# Patient Record
Sex: FEMALE | Race: WHITE
[De-identification: names, ages, dates, MRNs, and addresses within clinical notes are randomized per-mention and may not be internally consistent; named-entity substitution may affect disease eponyms.]

---

## 2017-10-28 ENCOUNTER — HOSPITAL ENCOUNTER (EMERGENCY)
Dept: HOSPITAL 59 - ER | Age: 57
Discharge: HOME | End: 2017-10-28
Payer: COMMERCIAL

## 2017-10-28 DIAGNOSIS — H53.8: Primary | ICD-10-CM

## 2017-10-28 PROCEDURE — 99282 EMERGENCY DEPT VISIT SF MDM: CPT

## 2017-10-28 NOTE — EMERGENCY DEPARTMENT RECORD
History of Present Illness





- General


Chief complaint: Eye Problem


Stated complaint: SOMETHING IN RT EYE


Time Seen by Provider: 10/28/17 20:35


Source: Patient


Mode of Arrival: Ambulatory


Limitations: No limitations





- History of Present Illness


Initial comments: 





55 yo female presents to ED for evaluation of blurry vision and FB sensation to 

the right eye that began after getting hair dye in her right eye 7.5 hours ago. 

Patient reports that she flushed her eye very well, but her symptoms became 

more noticeable this evening.  Patient denies injury to the eye, denies drainage

, fevers, chills, or recent illness.  Patient denies health problems at her 

baseline.


MD chief complaint: Eye redness, Foreign body, Vision change


Onset/Timin


-: Hour(s)


Onset Description: Sudden


Location: Right eye


Place: Other


If Injury: Chemical exposure


Eye Symptoms: Burning, Blurry vision


Severity: Mild


Severity scale (1-10): 2


If Pain, Quality: Burning


Consistency: Constant


Associated Symptoms: None


Treatments Prior to Arrival: Irrigated eye





- Related Data


Visual acuity (L) = 20/: 70


Visual acuity (R) = 20/: 70


With correction: Yes (20/50 ou)


 Home Medications











 Medication  Instructions  Recorded  Confirmed  Last Taken


 


Ibuprofen [Ibuprofen] 800 mg PO ASDIR 10/28/17 10/28/17 Unknown


 


Meloxicam [Meloxicam] 15 mg PO QHS 10/28/17 10/28/17 10/27/17











 Allergies











Allergy/AdvReac Type Severity Reaction Status Date / Time


 


No Known Drug Allergies Allergy   Verified 10/28/17 20:19














Travel Screening





- Travel/Exposure Within Last 30 Days


Have you traveled within the last 30 days?: No





- Travel/Exposure Within Last Year


Have you traveled outside the [place] in the last year?: No





- Additonal Travel Details


Have you been exposed to anyone with a communicable illness?: No





- Travel Symptoms


Symptom Screening: None





Review of Systems


Constitutional: Denies: Chills, Fever, Malaise, Night sweats


Eyes: Reports: Eye pain, Photophobia, Vision change.  Denies: Eye discharge


ENT: Denies: Congestion, Ear pain, Epistaxis


Respiratory: Denies: Cough, Dyspnea


Cardiovascular: Denies: Chest pain, Dyspnea on exertion


Endocrine: Denies: Fatigue, Heat or cold intolerance


Gastrointestinal: Denies: Abdominal pain, Nausea, Vomiting


Genitourinary: Denies: Incontinence, Retention


Musculoskeletal: Denies: Arthralgia, Back pain, Gout, Joint swelling


Skin: Denies: Bruising, Change in color


Neurological: Denies: Abnormal gait, Confusion, Headache, Seizure


Psychiatric: Denies: Anxiety


Hematological/Lymphatic: Denies: Anemia, Blood Clots





Past Medical History





- SOCIAL HISTORY


Smoking Status: Never smoker


Alcohol Use: None, Rare


Drug Use: None





- RESPIRATORY


Hx Respiratory Disorders: No





- CARDIOVASCULAR


Hx Cardio Disorders: No





- NEURO


Hx Neuro Disorders: No





- GI


Hx GI Disorders: No





- 


Hx Genitourinary Disorders: No





- ENDOCRINE


Hx Endocrine Disorders: No





- MUSCULOSKELETAL


Hx Musculoskeletal Disorders: Yes


Comment:: siatica





- PSYCH


Hx Psych Problems: No





- HEMATOLOGY/ONCOLOGY


Hx Hematology/Oncology Disorders: No





Family Medical History


Any Significant Family History?: No





Physical Exam





- General


General Appearance: Alert, Oriented x3, Cooperative, Mild distress


Limitations: No limitations





- Head


Head exam: Atraumatic, Normocephalic, Normal inspection


Head exam detail: negative: Abrasion, Contusion, Villanueva's sign, General 

tenderness, Hematoma, Laceration





- Eye


Eye exam: Normal appearance.  negative: Conjunctival injection, Periorbital 

swelling, Periorbital tenderness, Scleral icterus


With correction: Yes (20/50 ou)





- ENT


Ear exam: negative: Auricular hematoma, Auricular trauma


Nasal Exam: negative: Active bleeding, Discharge, Dried blood, Foreign body


Mouth exam: negative: Drooling, Laceration, Muffled voice, Tongue elevation





- Neck


Neck exam: Normal inspection.  negative: Meningismus, Tenderness





- Respiratory


Respiratory exam: Normal lung sounds bilaterally.  negative: Rales, Respiratory 

distress, Rhonchi, Stridor





- Cardiovascular


Cardiovascular Exam: Regular rate, Normal rhythm, Normal heart sounds





- GI/Abdominal


GI/Abdominal exam: Soft.  negative: Rebound, Rigid, Tenderness





- Rectal


Rectal exam: Deferred





- 


 exam: Deferred





- Extremities


Extremities exam: Normal inspection.  negative: Calf tenderness, Pedal edema, 

Tenderness





- Back


Back exam: Denies: CVA tenderness (R), CVA tenderness (L)





- Neurological


Neurological exam: Alert, Normal gait, Oriented X3





- Psychiatric


Psychiatric exam: Normal affect, Normal mood





- Skin


Skin exam: Normal color.  negative: Abrasion


Type of lesion: negative: abrasion





Course





 Vital Signs











  10/28/17





  20:22


 


Temperature 97.5 F L


 


Pulse Rate [ 83





Pulse Ox Probe] 


 


Respiratory 16





Rate 


 


Blood Pressure 141/83





[Left Arm] 


 


Pulse Ox 97














- Reevaluation(s)


Reevaluation #1: 





10/28/17 20:40


Eye examination: VA reviewed, 20/70 with each eye individually.


pH 7 on examination, florescein staining and Wood's lamp examination does not 

demonstrate and corneal abrasion on examination.  No FB identified on upper/

lower lid eversion as well. Patient was updated on all findings, will initiate 

treatment with Gentamicin ophthalmic drops and instructions to follow-up with 

Dr. Machuca on Monday.  Patient was instructed to return for any worsening of 

her symptoms as well.





Disposition


Disposition: Discharge


Clinical Impression: 


 Sensation of foreign body in eye





Disposition: Home, Self-Care


Condition: (2) Stable


Instructions:  Eye Foreign Body (ED)


Additional Instructions: 


Return to ED if your symptoms worsen or if you have any concerns.


Gentamycin as directed.


Follow-up with Dr. Machuca Monday as directed, call for appointment.


Referrals: 


HOLLIS MACHUCA [MEDICAL DOCTOR] - 


Forms:  Patient Portal Access


Time of Disposition: 20:36





Quality





- Quality Measures


Quality Measures: N/A





- Blood Pressure Screening


Does Patient Have Any of the Following: No


Blood Pressure Classification: Pre-Hypertensive BP Reading


Systolic Measurement: 141


Diastolic Measurement: 83


Screening for High Blood Pressure: < Pre-Hypertensive BP, F/U Documented > [

]


Pre-Hypertensive Follow-up Interventions: Referral to alternative/primary care 

provider.

## 2018-06-06 ENCOUNTER — HOSPITAL ENCOUNTER (OUTPATIENT)
Dept: HOSPITAL 59 - SUR | Age: 58
Discharge: HOME | End: 2018-06-06
Attending: ORTHOPAEDIC SURGERY
Payer: COMMERCIAL

## 2018-06-06 DIAGNOSIS — M19.011: ICD-10-CM

## 2018-06-06 DIAGNOSIS — S43.431A: ICD-10-CM

## 2018-06-06 DIAGNOSIS — M75.41: Primary | ICD-10-CM

## 2018-06-06 DIAGNOSIS — M75.01: ICD-10-CM

## 2018-06-06 PROCEDURE — 29822 SHO ARTHRS SRG LMTD DBRDMT: CPT

## 2018-06-06 PROCEDURE — 23700 MNPJ ANES SHO JT FIXJ APRATS: CPT

## 2018-06-06 PROCEDURE — 01630 ANES OPN/ARTHR PX SHO JT NOS: CPT

## 2018-06-06 PROCEDURE — 23415 RELEASE OF SHOULDER LIGAMENT: CPT

## 2018-06-07 NOTE — OPERATIVE NOTE
DATE:  06/06/2018.



PREOPERATIVE DIAGNOSIS:  SEVERE IMPINGEMENT OF THE RIGHT SHOULDER.  QUESTION 
TEAR OF THE ROTATOR CUFF.



POSTOPERATIVE DIAGNOSES:

1.  SEVERE EXTERNAL IMPINGEMENT, RIGHT SHOULDER.

2.  COMPLEX GLENOHUMERAL LABRAL TEAR FROM APPROXIMATELY THE 12 O'CLOCK TO 4 O'
CLOCK POSITION.

3.  ADVANCED ARTHROSIS, RIGHT DISTAL CLAVICLE.

4.  ADHESIVE CAPSULITIS, RIGHT SHOULDER.



PROCEDURES:

1.  SHOULDER ARTHROSCOPY WITH INTRA-ARTICULAR DEBRIDEMENT.

2.  RIGHT SHOULDER OPEN ACROMIOPLASTY, CORACOACROMIAL LIGAMENT RESECTION, AND 
SUBACROMIAL BURSECTOMY.

3.  RIGHT SHOULDER DISTAL CLAVICLE RESECTION.

4.  RIGHT SHOULDER MANIPULATION UNDER ANESTHESIA.



STAFF SURGEON:  Isrrael Hansno M.D.



ANESTHESIA:  General.



PREPARATION:  ChloraPrep.



INDIVIDUAL CONSIDERATIONS:  None.



DESCRIPTION OF PROCEDURE:  The patient was taken to the operating and placed 
supine on the operating table.  She had a successful induction of a general 
anesthetic.  She was then placed in a semi-seated beach chair position.



Examination under anesthesia showed that she had adhesive capsulitis.  I felt 
resistance to motion at about 110 degrees of forward flexion and abduction.  I 
manipulated her to full motion, and there was no instability.  



The patient had posterior portal identified for arthroscopy.  The skin was 
infiltrated with 0.5% Marcaine with epinephrine prior.  An 18-gauge spinal 
needle was easily placed in the joint, and the joint was inflated with normal 
saline.  A stab wound was made, a blunt-tipped trocar for the scope was placed 
in the joint, and the joint was inflated with normal saline.  An anterior 
accessory portal was then made just inferior to the intact long head of the 
biceps tendon in a retrograde fashion with a Wissinger naa, and the joint was 
irrigated out.  The patient had fraying of the labrum from 12 o'clock to 4 o'
clock.  This was debrided.  The long head was good.  The glenohumeral joint was 
good.  The subscapularis tendon was normal.  The supraspinatus, infraspinatus, 
and teres minor looked normal.  However the supraspinatus had areas of 
injection consistent with inflammation.  No loose bodies were seen in the 
inferior gutter.  After irrigation, the portals were closed with staples.  



The patient had an anterior approach to the subacromial space and distal 
clavicle.  The skin was again infiltrated with 0.5% Marcaine with epinephrine 
prior.  Sharp dissection was carried down through the skin and subcutaneous 
tissue.  Small veins were coagulated with a Bovie.  An anterior deltoid 
interval was developed.  Care was taken not to split the deltoid more than 
about 4.0 cm distal to the anterior tip of the acromion to prevent injury to 
the axillary nerve.  Once in the subacromial space, there was a very thick 
bursa and large spurs.  The deltoid was then taken subperiosteally off the 
anterior aspect of the acromion, over the top of the intact coracoacromial 
ligament, and off the anterior aspect of the degenerated distal clavicle.  The 
coracoacromial ligament was resected with a Bovie.  The distal clavicle was 
resected with an oscillating saw, taking a little under 1.0 cm.  The patient 
had a downsloping acromion with spurs, especially extending from the 
acromioclavicular joint.  An anterior acromioplasty was performed, taking 6.0 
to 7.0 mm anteriorly and then tapering to a wedge posteromedially to include 
the spurs.  The undersurface was then smoothed off with a rasp.  I then 
performed a complete bursectomy.  The bursa in some areas was 3.0 to 4.0 mm 
thick.  I now had a good look at the rotator cuff, and it looked intact.  The 
supraspinatus looked obviously contused and abraded, but it did not need to be 
repaired.  After irrigation I placed a 22-gauge needle through the skin and 
into the joint.  I then reattached the deltoid to the remaining acromion with 
multiple interrupted #2 Vicryl, going directly through the bony acromion.  The 
periosteal cuff and distal clavicle were closed with a running #2 Vicryl.  The 
anterior deltoid interval was closed with running #1 Vicryl.  Subcutaneous was 
closed with #2-0+ Vicryl.  The skin was closed with running #3-0 Quill.  I did 
place an 18-gauge spinal needle into the subacromial space.  Into that space I 
injected 10 mL of 0.5% Marcaine along with 10 mg of morphine.  Through the 
previously placed 22-gauge needle into the joint I then placed 5.0 mL of 
Marcaine with epinephrine along with 40 mg of Depo Medrol.  A sterile Bulkee 
compressive dressing and sling were applied.  



The patient tolerated the procedures well, and needle and sponge counts were 
correct.  Estimated blood loss was minimal, and she was taken back to to 
Recovery in good condition.  There were no complications.



Job Number:  [id number]



cc:  DARNELL Canseco